# Patient Record
Sex: FEMALE | URBAN - METROPOLITAN AREA
[De-identification: names, ages, dates, MRNs, and addresses within clinical notes are randomized per-mention and may not be internally consistent; named-entity substitution may affect disease eponyms.]

---

## 2019-01-08 ENCOUNTER — NURSE TRIAGE (OUTPATIENT)
Dept: NURSING | Facility: CLINIC | Age: 54
End: 2019-01-08

## 2019-01-08 NOTE — TELEPHONE ENCOUNTER
"Caller: shant ([phone])   Reason for call: \"I'm in [place] for business and I'm having sharp abdominal pains, it is right sided lower abdomen pain, I bought some Azo tablets thinking it might be the start of a UTI, but I've had no relief, what should I do?\" Reports hx of ovarian cyst, appendectomy, and partial hysterectomy (still have ovaries). Reports recent antibiotic usage for sinus infection. Reports last BM was today, a small one (last normal BM was 2 days ago).  Symptoms: RLQ intermittent sharp pain (every 30 min or so), nausea, \"feel cold\", mild chills, urinary urgency, \"didn't feel the greatest yesterday\", bloated   Symptoms started last night  Denies burning/pain when urinating, blood in urine/stool, vomiting, diarrhea, radiating pain, or recent abdominal injuries  Fever? Unsure, feels cold, chills   How long? unsure   Measured: no thermometer  Fever reducer given? Yes, Tylenol  Surgery? denies    Hospitalizations? denies  Pain? yes  Location: RLQ (abdomen)  Rated: \"5/10\"  Constant or intermittent?  intermittent   Home cares tried: Azo, probiotic drink, Tylenol  Emergent symptoms reviewed.  Care advice given per triage protocol; per triage guideline, advised caller to be seen by a provider within the next 4 hours, have another adult drive for safety and remain NPO until seen.     Caller verbalized understanding of care advice given and prefers to drive herself to the [facility] now. Caller had no further questions. Encouraged call back to [facility] 24/7 for nurse line services, new/worsening symptoms or further questions.    Mila Poole RN  [facility]  (see bottom of encounter for care advice details)  Reason for Disposition    [1] MILD-MODERATE pain AND [2] constant AND [3] present > 2 hours    Additional Information    Negative: Shock suspected (e.g., cold/pale/clammy skin, too weak to stand, low BP, rapid pulse)    Negative: Difficult to awaken or acting confused  (e.g., disoriented, " "slurred speech)    Negative: Passed out (i.e., lost consciousness, collapsed and was not responding)    Negative: Sounds like a life-threatening emergency to the triager    Negative: Chest pain    Negative: Pain is mainly in upper abdomen  (if needed ask: \"is it mainly above the belly button?\")    Negative: Followed an abdomen (stomach) injury    Negative: [1] Abdominal pain AND [2] pregnant < 20 weeks    Negative: [1] Abdominal pain AND [2] pregnant > 20 weeks    Negative: [1] Abdominal pain AND [2] postpartum < 1 month since delivery    Negative: [1] SEVERE pain (e.g., excruciating) AND [2] present > 1 hour    Negative: [1] SEVERE pain AND [2] age > 60    Negative: [1] Vomiting AND [2] contains red blood or black (\"coffee ground\") material  (Exception: few red streaks in vomit that only happened once)    Negative: Blood in bowel movements   (Exception: blood on surface of BM with constipation)    Negative: Black or tarry bowel movements  (Exception: chronic-unchanged  black-grey bowel movements AND is taking iron pills or Pepto-bismol)    Negative: Patient sounds very sick or weak to the triager    Answer Assessment - Initial Assessment Questions  1. LOCATION: \"Where does it hurt?\"       Right lower abdomen  2. RADIATION: \"Does the pain shoot anywhere else?\" (e.g., chest, back)      denies  3. ONSET: \"When did the pain begin?\" (e.g., minutes, hours or days ago)       Last night  4. SUDDEN: \"Gradual or sudden onset?\"      gradual  5. PATTERN \"Does the pain come and go, or is it constant?\"     - If constant: \"Is it getting better, staying the same, or worsening?\"       (Note: Constant means the pain never goes away completely; most serious pain is constant and it progresses)      - If intermittent: \"How long does it last?\" \"Do you have pain now?\"      (Note: Intermittent means the pain goes away completely between bouts)      Comes and goes, sharp pains  6. SEVERITY: \"How bad is the pain?\"  (e.g., Scale 1-10; mild, " "moderate, or severe)    - MILD (1-3): doesn't interfere with normal activities, abdomen soft and not tender to touch     - MODERATE (4-7): interferes with normal activities or awakens from sleep, tender to touch     - SEVERE (8-10): excruciating pain, doubled over, unable to do any normal activities       5/10  7. RECURRENT SYMPTOM: \"Have you ever had this type of abdominal pain before?\" If so, ask: \"When was the last time?\" and \"What happened that time?\"       Unsure, previous ovarian cyst  8. CAUSE: \"What do you think is causing the abdominal pain?\"      Unsure, UTI?  9. RELIEVING/AGGRAVATING FACTORS: \"What makes it better or worse?\" (e.g., movement, antacids, bowel movement)      nothing  10. OTHER SYMPTOMS: \"Has there been any vomiting, diarrhea, constipation, or urine problems?\"        Urine urgency, bloating, nausea  11. PREGNANCY: \"Is there any chance you are pregnant?\" \"When was your last menstrual period?\"        N/a, hysterectomy    Protocols used: ABDOMINAL PAIN - FEMALE-ADULT-AH      "